# Patient Record
Sex: MALE | Employment: FULL TIME | URBAN - METROPOLITAN AREA
[De-identification: names, ages, dates, MRNs, and addresses within clinical notes are randomized per-mention and may not be internally consistent; named-entity substitution may affect disease eponyms.]

---

## 2020-02-19 ENCOUNTER — TRANSFERRED RECORDS (OUTPATIENT)
Dept: HEALTH INFORMATION MANAGEMENT | Facility: CLINIC | Age: 60
End: 2020-02-19

## 2020-09-08 ENCOUNTER — TRANSFERRED RECORDS (OUTPATIENT)
Dept: HEALTH INFORMATION MANAGEMENT | Facility: CLINIC | Age: 60
End: 2020-09-08

## 2020-09-10 ENCOUNTER — TRANSFERRED RECORDS (OUTPATIENT)
Dept: HEALTH INFORMATION MANAGEMENT | Facility: CLINIC | Age: 60
End: 2020-09-10

## 2020-10-01 ENCOUNTER — TRANSCRIBE ORDERS (OUTPATIENT)
Dept: OTHER | Age: 60
End: 2020-10-01

## 2020-10-01 DIAGNOSIS — M54.2 CERVICALGIA: ICD-10-CM

## 2020-10-01 DIAGNOSIS — M48.02 SPINAL STENOSIS IN CERVICAL REGION: ICD-10-CM

## 2020-10-01 DIAGNOSIS — S16.1XXD STRAIN OF MUSCLE, FASCIA AND TENDON AT NECK LEVEL, SUBSEQUENT ENCOUNTER: Primary | ICD-10-CM

## 2020-10-09 ENCOUNTER — TELEPHONE (OUTPATIENT)
Dept: NEUROSURGERY | Facility: CLINIC | Age: 60
End: 2020-10-09

## 2020-10-30 ENCOUNTER — OFFICE VISIT (OUTPATIENT)
Dept: NEUROLOGY | Facility: CLINIC | Age: 60
End: 2020-10-30
Payer: OTHER MISCELLANEOUS

## 2020-10-30 VITALS
DIASTOLIC BLOOD PRESSURE: 73 MMHG | RESPIRATION RATE: 16 BRPM | HEART RATE: 85 BPM | OXYGEN SATURATION: 97 % | SYSTOLIC BLOOD PRESSURE: 117 MMHG | WEIGHT: 153 LBS

## 2020-10-30 DIAGNOSIS — M99.51 INTERVERTEBRAL DISC STENOSIS OF NEURAL CANAL OF CERVICAL REGION: Primary | ICD-10-CM

## 2020-10-30 PROCEDURE — 99215 OFFICE O/P EST HI 40 MIN: CPT | Performed by: NEUROLOGICAL SURGERY

## 2020-10-30 NOTE — LETTER
"     10/30/2020         RE: Hero Lund  [address] [place] [address]        Dear Colleague,    Thank you for referring your patient, Hero Lund, to the [facility] [place]. Please see a copy of my visit note below.    Mr. Lund is a 60-year-old man seen at the request of Dr. Clinton Winters in [place] for evaluation of recent episode of cervical pain which began following a work-related job injury on August 30, 2019.  On that date he was reportedly lifting the ramirez of a school bus.  School bus slid subsequently dropped and landed on his head.  He experienced reportedly immediate onset of pain in the posterior cervical region.  This problem was of significant functional limitation and Mr. Lund was evaluated by Dr. Winters.  A course of physical therapy was initiated with significant symptomatic improvement over the past year.  Initially, his symptoms were more prominent toward the right side and he noted that with cervical rotation that the right side of his neck would \"lock up.\"  However, he denied any symptoms consistent with cervical radiculopathy or myelopathy.  He notes that over the past several months that his symptoms have continued to improve.  He has some persistent neck pain but presently this does not appear to be functionally limiting.  He denies Lhermitte's phenomenon.  He does complain of some mechanical low back pain which is intermittently present as well.  He denies numbness or weakness of his upper extremities.  He is noted to be right-hand dominant.    He has no report of prior cervical injuries or significant episodes of neck pain.  He denies chiropractic treatment or cervical MRI scans prior to the reported work-related injury.    On examination, he is awake alert and oriented x3 with memory intact for recent and remote events and speech clear in character and content.  There are no focal cranial nerve abnormalities identified.  Cervical range of " motion demonstrates chin to chest without significant difficulty or pain behavior.  He is capable of 20 degrees of cervical extension and more than 45 degrees of cervical rotation in either direction.  There are no radicular symptoms noted with this range of motion.  Motor examination of his bilateral upper extremities shows 5/5 strength throughout including deltoids, biceps, triceps, brachioradialis and hand intrinsics bilaterally.  Phalen sign is negative.  There is no intrinsic hand muscle atrophy.  Tinel's sign is negative at the wrists and elbows bilaterally.  Gait is normal.  Deep tendon reflexes are 1+ and equal at the knees and ankles bilaterally and symmetrically.  Upper extremity reflexes similarly are 1+ and symmetric throughout.  There is no ankle clonus noted and toes are moved with plantar stimulation.  There are no meningeal signs present.  Gait is normal.    Review of his cervical MRI scan obtained on September 8, 2020 shows 2 level degenerative cervical disc disease at C5-6 and at C6-7.  He is noted to have foraminal narrowing to the right side at C4-5, C5-6 and C6-7.  There is however no evidence of focal neurologic impingement or acute change affecting the foramen bilaterally.  He is noted to have a central and slightly right-sided disc bulge at the C5-6 level with some caudal extension.  He is noted to have a central disc bulge or herniation at C6-7 which contacts and effaces the ventral cervical spinal cord.  There is persistent but narrowed dorsal subarachnoid space.  There is no evidence of increased signal within the cervical spinal cord at any level.  Cervical alignment otherwise appears satisfactory.    Assessment: 2 level degenerative cervical disc changes which may be acute and related to the reported work-related injury more than a year ago.  There is no objective evidence of radicular or myelopathic deficit or symptoms consistent with those conditions.  Of note, his neck pain and  interscapular symptoms appear to be improving and are not presently functionally limiting apparently.  I have reviewed the clinical and radiographic findings with the patient and his wife in great detail and discussed management alternatives including both surgical and nonsurgical means of management.  He is clear that he is not interested in surgical intervention and given the lack of objective deficit involving his upper or lower extremities as well as the lack of any radiographic evidence of cervical myelopathy I would not recommend surgical intervention at this point in his clinical course.  I spoke with him at length regarding the potential for cervical spinal cord compression should he be involved in motor vehicle accident or have additional central disc herniation occur.  We discussed appropriate activity restrictions on ATVs, snowmobiles, contact sports, etc.  We have agreed to repeat the cervical MRI scan in 1 year.  I have asked him to call or return to the office in any time should he develop increased symptoms related to his neck or any evidence or problems with upper or lower extremity ataxia, loss of coordination, weakness, sensory loss, etc. which I discussed with the patient and his wife in great detail.  He appeared to have good understanding, asked appropriate questions which I answered and will follow up as needed should his condition deteriorate or change significantly in severity or character.    More than 55 minutes was spent direct contact with the patient the majority reviewing the clinical and radiographic findings, management alternatives, risks and benefits.      Again, thank you for allowing me to participate in the care of your patient.        Sincerely,        Yosef Benítez MD

## 2020-10-30 NOTE — PROGRESS NOTES
"Mr. Lund is a 60-year-old man seen at the request of Dr. Clinton Winters in [place] for evaluation of recent episode of cervical pain which began following a work-related job injury on August 30, 2019.  On that date he was reportedly lifting the ramirez of a school bus.  School bus slid subsequently dropped and landed on his head.  He experienced reportedly immediate onset of pain in the posterior cervical region.  This problem was of significant functional limitation and Mr. Lund was evaluated by Dr. Winters.  A course of physical therapy was initiated with significant symptomatic improvement over the past year.  Initially, his symptoms were more prominent toward the right side and he noted that with cervical rotation that the right side of his neck would \"lock up.\"  However, he denied any symptoms consistent with cervical radiculopathy or myelopathy.  He notes that over the past several months that his symptoms have continued to improve.  He has some persistent neck pain but presently this does not appear to be functionally limiting.  He denies Lhermitte's phenomenon.  He does complain of some mechanical low back pain which is intermittently present as well.  He denies numbness or weakness of his upper extremities.  He is noted to be right-hand dominant.    He has no report of prior cervical injuries or significant episodes of neck pain.  He denies chiropractic treatment or cervical MRI scans prior to the reported work-related injury.    On examination, he is awake alert and oriented x3 with memory intact for recent and remote events and speech clear in character and content.  There are no focal cranial nerve abnormalities identified.  Cervical range of motion demonstrates chin to chest without significant difficulty or pain behavior.  He is capable of 20 degrees of cervical extension and more than 45 degrees of cervical rotation in either direction.  There are no radicular symptoms noted with this range of " motion.  Motor examination of his bilateral upper extremities shows 5/5 strength throughout including deltoids, biceps, triceps, brachioradialis and hand intrinsics bilaterally.  Phalen sign is negative.  There is no intrinsic hand muscle atrophy.  Tinel's sign is negative at the wrists and elbows bilaterally.  Gait is normal.  Deep tendon reflexes are 1+ and equal at the knees and ankles bilaterally and symmetrically.  Upper extremity reflexes similarly are 1+ and symmetric throughout.  There is no ankle clonus noted and toes are moved with plantar stimulation.  There are no meningeal signs present.  Gait is normal.    Review of his cervical MRI scan obtained on September 8, 2020 shows 2 level degenerative cervical disc disease at C5-6 and at C6-7.  He is noted to have foraminal narrowing to the right side at C4-5, C5-6 and C6-7.  There is however no evidence of focal neurologic impingement or acute change affecting the foramen bilaterally.  He is noted to have a central and slightly right-sided disc bulge at the C5-6 level with some caudal extension.  He is noted to have a central disc bulge or herniation at C6-7 which contacts and effaces the ventral cervical spinal cord.  There is persistent but narrowed dorsal subarachnoid space.  There is no evidence of increased signal within the cervical spinal cord at any level.  Cervical alignment otherwise appears satisfactory.    Assessment: 2 level degenerative cervical disc changes which may be acute and related to the reported work-related injury more than a year ago.  There is no objective evidence of radicular or myelopathic deficit or symptoms consistent with those conditions.  Of note, his neck pain and interscapular symptoms appear to be improving and are not presently functionally limiting apparently.  I have reviewed the clinical and radiographic findings with the patient and his wife in great detail and discussed management alternatives including both surgical  and nonsurgical means of management.  He is clear that he is not interested in surgical intervention and given the lack of objective deficit involving his upper or lower extremities as well as the lack of any radiographic evidence of cervical myelopathy I would not recommend surgical intervention at this point in his clinical course.  I spoke with him at length regarding the potential for cervical spinal cord compression should he be involved in motor vehicle accident or have additional central disc herniation occur.  We discussed appropriate activity restrictions on ATVs, snowmobiles, contact sports, etc.  We have agreed to repeat the cervical MRI scan in 1 year.  I have asked him to call or return to the office in any time should he develop increased symptoms related to his neck or any evidence or problems with upper or lower extremity ataxia, loss of coordination, weakness, sensory loss, etc. which I discussed with the patient and his wife in great detail.  He appeared to have good understanding, asked appropriate questions which I answered and will follow up as needed should his condition deteriorate or change significantly in severity or character.    More than 55 minutes was spent direct contact with the patient the majority reviewing the clinical and radiographic findings, management alternatives, risks and benefits.

## 2021-08-04 ENCOUNTER — TELEPHONE (OUTPATIENT)
Dept: NEUROSURGERY | Facility: CLINIC | Age: 61
End: 2021-08-04

## 2021-08-04 NOTE — TELEPHONE ENCOUNTER
Second attempt to reach pt. VM box full. Will attempt to reach out to pt again.       Sharyn Alvarenga, RNCC  Neurology

## 2021-08-04 NOTE — TELEPHONE ENCOUNTER
Health Call Center    Phone Message    May a detailed message be left on voicemail: yes     Reason for Call: [facility] called and stated the patient has had new images that show some progression.  Dr. Brown would like to have a discuss on the best way to get the new imaging to Dr. Benítez for review and further discussion regarding comparison.  Dr. Brown will be in on 8/6/2021.  Thank you.    [phone]- [facility] [place] [facility] office number.       Action Taken: Message routed to:  Adult Clinics: Neurology p [id number]    Travel Screening: Not Applicable

## 2021-08-04 NOTE — TELEPHONE ENCOUNTER
Terese, from [facility] office informed writer that pt had another cervical spine MRI on 7/27/21 at [facility]. Dr. Brown reviewed images which revealed some progression and requests that Dr. Benítez review images to determine if additional treatment/intervention is needed at this time. Dr. Brown will be in his office this Friday 8/6/21 to further discuss with Dr. Benítez if needed.     Writer attempted to reach pt to discuss any worsening symptoms. Will attempt to call pt back at another since VM was full.    Dr Brown cell: [phone]

## 2021-08-06 NOTE — TELEPHONE ENCOUNTER
Dr. Benítez was able to view pt's recent MRI images and left a VM for Dr. Brown. Changes were noted and Dr. Brown to call Dr. Benítez back to discuss or else pt can be seen again by Dr. Benítez at  if this is preferred.       Sharyn Alvarenga, RNCC  Neurology

## 2021-09-03 ENCOUNTER — OFFICE VISIT (OUTPATIENT)
Dept: NEUROSURGERY | Facility: CLINIC | Age: 61
End: 2021-09-03
Payer: OTHER MISCELLANEOUS

## 2021-09-03 VITALS — SYSTOLIC BLOOD PRESSURE: 116 MMHG | WEIGHT: 156 LBS | HEART RATE: 75 BPM | DIASTOLIC BLOOD PRESSURE: 76 MMHG

## 2021-09-03 DIAGNOSIS — M48.02 SPINAL STENOSIS IN CERVICAL REGION: Primary | ICD-10-CM

## 2021-09-03 PROCEDURE — 99215 OFFICE O/P EST HI 40 MIN: CPT | Performed by: NEUROLOGICAL SURGERY

## 2021-09-03 RX ORDER — IBUPROFEN 800 MG/1
800 TABLET, FILM COATED ORAL EVERY 8 HOURS PRN
COMMUNITY

## 2021-09-03 ASSESSMENT — PAIN SCALES - GENERAL: PAINLEVEL: MILD PAIN (3)

## 2021-09-03 NOTE — PROGRESS NOTES
Mr. Lund is seen back 10 months following initial evaluation at the request of Dr. Brown following a recent repeat cervical MRI scan.  He appears to be doing well with minimal complaints related to his neck.  He notes some neck stiffness and reports a locking type sensation with cervical extension, looking up or looking up and to one side or the other.  This has been more of a bother in the past and was reportedly treated successfully with chiropractic treatment without manipulation of his cervical spine reportedly.  He has intermittent symptoms in his interscapular region but denies any symptoms into any of his extremities.  He has no complaint of electrical sensation or Lhermitte's phenomenon with cervical range of motion.  He denies difficulty with weakness, sensory loss, ataxia, loss of balance, etc.  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  He remains quite active otherwise, hunting, fishing, riding a motorcycle and occasionally riding an ATV when he is ice fishing.  He is not participating in contact sports, has not suffered any falls and has reduced his activities which have a high likelihood of falling off a ladder, etc.    On examination today in the office he is awake alert and oriented x3 with memory intact for recent and remote events and speech clear in character and content.  There are no focal cranial nerve abnormalities identified.  Cervical range of motion is well-preserved with approximately 20 degrees of cervical extension with minimal pain complaint.  Lhermitte's phenomenon is absent; Spurling sign is negative with rotation in either direction to 45 degrees.  Motor examination of his bilateral upper extremity shows 5/5 strength throughout without exception or asymmetry.  Deep tendon reflexes are 2+ and equal.  Gait is normal without evidence of ataxia.  Fine motor coordination of his hands is unchanged.  He is  capable of tandem gait without difficulty.  Lower extremity reflexes are 1+ and equal at the knees and ankles bilaterally.  There is no ankle clonus noted and toes are downgoing with plantar stimulation.    Review of his recent cervical MRI scan shows no change in the abnormality at the C5-6 level with disc degeneration and a small focal disc protrusion slightly more eccentric toward the right than the left producing mild to moderate proximal foraminal stenosis.  At the C6-7 level previously noted disc herniation appears to be slightly broader and does indent the ventral spinal cord.  This does not appear to have changed significantly in comparison with the study in 2020.  There is no evidence to my review of intrinsic signal change within the cervical spinal cord and a small amount of dorsal subarachnoid space remains evident.  There are no new abnormalities at other levels and cervical alignment remains satisfactory.    Assessment: Minimally symptomatic cervical stenosis at C6-7 and C5-6 without evidence of radiographic or clinical cervical myelopathy.  There is no evidence of focal objective neurologic deficit involving any of the extremities essentially no change in the examination in comparison with 2020.    I reviewed the clinical and radiographic findings in detail with the patient and his wife today in the office once again.  I spoke with them regarding indications for surgical decompression of the C6-7 level in particular.  I had another lengthy discussion with him regarding the possibility of cervical spinal cord injury because of the narrowing of the canal at this level.  I explained that while the chances of this occurring abruptly are small that there is minimal recuperative ability for the spinal cord to recover after an injury.  For that reason I further explained that we strongly recommend avoiding spinal cord injury since there is no effective treatment.  We discussed the possibility and chances of  progression in this problem to include cervical myelopathy or significant neurologic deficit up to and including quadriplegia versus risk and benefit of what is essentially a prophylactic anterior cervical discectomy and possible arthroplasty at the C6-7 level.  I explained the details of the surgery to the patient and his wife once again during the course of this visit.    Mr. Lund is questions appeared to be answered to his apparent satisfaction.  He wishes to consider matter further prior to making a decision whether to continue with nonoperative precautions or to consider prophylactic operative intervention.  We discussed commonsense restrictions such as avoiding ladders, not climbing on the roof to clear snow or leaves, taking particular care on steps or on icy days this winter.    Mr. Lund elects not to proceed with surgery I would recommend a repeat cervical MRI scan and repeat examination for evidence of myelopathy in 1 year, sooner should he develop any symptoms consistent with ataxia, weakness in his extremities, loss of dexterity, etc. which we discussed in substantial detail in the office today.    Approximately 45 minutes was spent in total majority reviewing the clinical and radiographic findings, management alternatives, risks and benefits.

## 2021-09-03 NOTE — LETTER
9/3/2021         RE: Hero Lund  [address] [place] [address]        Dear Colleague,    Thank you for referring your patient, Hero Lund, to the [facility]UROSURG[facility] [place]. Please see a copy of my visit note below.    Mr. Lund is seen back 10 months following initial evaluation at the request of Dr. Brown following a recent repeat cervical MRI scan.  He appears to be doing well with minimal complaints related to his neck.  He notes some neck stiffness and reports a locking type sensation with cervical extension, looking up or looking up and to one side or the other.  This has been more of a bother in the past and was reportedly treated successfully with chiropractic treatment without manipulation of his cervical spine reportedly.  He has intermittent symptoms in his interscapular region but denies any symptoms into any of his extremities.  He has no complaint of electrical sensation or Lhermitte's phenomenon with cervical range of motion.  He denies difficulty with weakness, sensory loss, ataxia, loss of balance, etc.  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  He remains quite active otherwise, hunting, fishing, riding a motorcycle and occasionally riding an ATV when he is ice fishing.  He is not participating in contact sports, has not suffered any falls and has reduced his activities which have a high likelihood of falling off a ladder, etc.    On examination today in the office he is awake alert and oriented x3 with memory intact for recent and remote events and speech clear in character and content.  There are no focal cranial nerve abnormalities identified.  Cervical range of motion is well-preserved with approximately 20 degrees of cervical extension with minimal pain complaint.  Lhermitte's phenomenon is absent; Spurling sign is negative with rotation in either direction to 45  degrees.  Motor examination of his bilateral upper extremity shows 5/5 strength throughout without exception or asymmetry.  Deep tendon reflexes are 2+ and equal.  Gait is normal without evidence of ataxia.  Fine motor coordination of his hands is unchanged.  He is capable of tandem gait without difficulty.  Lower extremity reflexes are 1+ and equal at the knees and ankles bilaterally.  There is no ankle clonus noted and toes are downgoing with plantar stimulation.    Review of his recent cervical MRI scan shows no change in the abnormality at the C5-6 level with disc degeneration and a small focal disc protrusion slightly more eccentric toward the right than the left producing mild to moderate proximal foraminal stenosis.  At the C6-7 level previously noted disc herniation appears to be slightly broader and does indent the ventral spinal cord.  This does not appear to have changed significantly in comparison with the study in 2020.  There is no evidence to my review of intrinsic signal change within the cervical spinal cord and a small amount of dorsal subarachnoid space remains evident.  There are no new abnormalities at other levels and cervical alignment remains satisfactory.    Assessment: Minimally symptomatic cervical stenosis at C6-7 and C5-6 without evidence of radiographic or clinical cervical myelopathy.  There is no evidence of focal objective neurologic deficit involving any of the extremities essentially no change in the examination in comparison with 2020.    I reviewed the clinical and radiographic findings in detail with the patient and his wife today in the office once again.  I spoke with them regarding indications for surgical decompression of the C6-7 level in particular.  I had another lengthy discussion with him regarding the possibility of cervical spinal cord injury because of the narrowing of the canal at this level.  I explained that while the chances of this occurring abruptly are small  that there is minimal recuperative ability for the spinal cord to recover after an injury.  For that reason I further explained that we strongly recommend avoiding spinal cord injury since there is no effective treatment.  We discussed the possibility and chances of progression in this problem to include cervical myelopathy or significant neurologic deficit up to and including quadriplegia versus risk and benefit of what is essentially a prophylactic anterior cervical discectomy and possible arthroplasty at the C6-7 level.  I explained the details of the surgery to the patient and his wife once again during the course of this visit.    Mr. Lund is questions appeared to be answered to his apparent satisfaction.  He wishes to consider matter further prior to making a decision whether to continue with nonoperative precautions or to consider prophylactic operative intervention.  We discussed commonsense restrictions such as avoiding ladders, not climbing on the roof to clear snow or leaves, taking particular care on steps or on icy days this winter.    Mr. Lund elects not to proceed with surgery I would recommend a repeat cervical MRI scan and repeat examination for evidence of myelopathy in 1 year, sooner should he develop any symptoms consistent with ataxia, weakness in his extremities, loss of dexterity, etc. which we discussed in substantial detail in the office today.    Approximately 45 minutes was spent in total majority reviewing the clinical and radiographic findings, management alternatives, risks and benefits.        Again, thank you for allowing me to participate in the care of your patient.        Sincerely,        Yosef Benítez MD

## 2024-03-28 ENCOUNTER — LAB REQUISITION (OUTPATIENT)
Dept: LAB | Facility: CLINIC | Age: 64
End: 2024-03-28

## 2024-03-28 PROCEDURE — 88342 IMHCHEM/IMCYTCHM 1ST ANTB: CPT | Mod: TC | Performed by: DENTIST
